# Patient Record
Sex: FEMALE | Race: WHITE | URBAN - METROPOLITAN AREA
[De-identification: names, ages, dates, MRNs, and addresses within clinical notes are randomized per-mention and may not be internally consistent; named-entity substitution may affect disease eponyms.]

---

## 2021-06-23 ENCOUNTER — OFFICE VISIT (OUTPATIENT)
Dept: ORTHOPEDIC SURGERY | Age: 36
End: 2021-06-23
Payer: OTHER GOVERNMENT

## 2021-06-23 VITALS
BODY MASS INDEX: 29.52 KG/M2 | RESPIRATION RATE: 16 BRPM | HEIGHT: 63 IN | HEART RATE: 100 BPM | WEIGHT: 166.6 LBS | OXYGEN SATURATION: 100 % | TEMPERATURE: 98.3 F

## 2021-06-23 DIAGNOSIS — M25.561 CHRONIC PAIN OF RIGHT KNEE: ICD-10-CM

## 2021-06-23 DIAGNOSIS — G89.29 CHRONIC PAIN OF RIGHT KNEE: ICD-10-CM

## 2021-06-23 DIAGNOSIS — M22.2X1 RIGHT PATELLOFEMORAL SYNDROME: Primary | ICD-10-CM

## 2021-06-23 PROCEDURE — 99203 OFFICE O/P NEW LOW 30 MIN: CPT | Performed by: ORTHOPAEDIC SURGERY

## 2021-06-23 PROCEDURE — 73562 X-RAY EXAM OF KNEE 3: CPT | Performed by: ORTHOPAEDIC SURGERY

## 2021-06-23 RX ORDER — BUPROPION HYDROCHLORIDE 150 MG/1
TABLET, EXTENDED RELEASE ORAL
COMMUNITY
Start: 2020-09-24

## 2021-06-23 RX ORDER — OXYCODONE HYDROCHLORIDE 15 MG/1
TABLET ORAL
COMMUNITY
Start: 2021-05-11

## 2021-06-23 RX ORDER — CYCLOBENZAPRINE HCL 5 MG
5 TABLET ORAL AT BEDTIME
COMMUNITY

## 2021-06-23 NOTE — PROGRESS NOTES
Patient: Sarah Live                MRN: [id number]       SSN: [id number]  YOB: 1985        AGE: 37 y.o. SEX: female  Body mass index is 29.51 kg/m². PCP: Cesar Caceres NP  21    CHIEF COMPLAINT: RIght knee pain 3/10    HPI: Sarah Live is a 37 y.o. female patient who presents to the office today with approximate 1 year of right knee pain. She describes the pain as a sharp searing pain deep in the knee. She localizes it to deep to the kneecap. She has not had any specific treatment for this. She has had an MRI. She has not had any physical therapy. She does not take anything for the pain. She does take medication for chronic back pain. She says the pain is worse when she goes up and down stairs or gets up from a seated position. Past Medical History:   Diagnosis Date    Baastrup's syndrome     Bulging lumbar disc     Kyphosis     Migraine     Scoliosis     Tachycardia     Tarlov cyst     Vertigo        History reviewed. No pertinent family history. Current Outpatient Medications   Medication Sig Dispense Refill    oxyCODONE IR (OXY-IR) 15 mg immediate release tablet 1 TABLET 4 TIMES PER DAY AS NEEDED FOR CHRONIC BACK PAIN ORALLY 30 DAYS      buPROPion SR (WELLBUTRIN SR) 150 mg SR tablet TK 1 T PO BID      cyclobenzaprine (FLEXERIL) 5 mg tablet Take 5 mg by mouth At bedtime.          Allergies   Allergen Reactions    Acetazolamide Rash    Duloxetine Nausea Only and Other (comments)       Past Surgical History:   Procedure Laterality Date    HX  SECTION      HX TUBAL LIGATION         Social History     Socioeconomic History    Marital status:      Spouse name: Not on file    Number of children: Not on file    Years of education: Not on file    Highest education level: Not on file   Occupational History    Not on file   Tobacco Use    Smoking status: Current Every Day Smoker     Types: Cigarettes    Smokeless tobacco: Never Used Vaping Use    Vaping Use: Never used   Substance and Sexual Activity    Alcohol use: Never    Drug use: Never    Sexual activity: Not on file   Other Topics Concern    Not on file   Social History Narrative    Not on file     Social Determinants of Health     Financial Resource Strain:     Difficulty of Paying Living Expenses:    Food Insecurity:     Worried About Running Out of Food in the Last Year:     [address] in the Last Year:    Transportation Needs:     Lack of Transportation (Medical):  Lack of Transportation (Non-Medical):    Physical Activity:     Days of Exercise per Week:     Minutes of Exercise per Session:    Stress:     Feeling of Stress :    Social Connections:     Frequency of Communication with Friends and Family:     Frequency of Social Gatherings with Friends and Family:     Attends [employer] Services:     Active Member of Clubs or Organizations:     Attends Club or Organization Meetings:     Marital Status:    Intimate Partner Violence:     Fear of Current or Ex-Partner:     Emotionally Abused:     Physically Abused:     Sexually Abused:        REVIEW OF SYSTEMS:      CON: negative for recent weight loss/gain, fever, or chills  EYE: negative for double or blurry vision  ENT: negative for hoarseness  RS:   negative for cough, URI, SOB  CV:  negative for chest pain, palpitations  GI:    negative for blood in stool, nausea/vomiting  :  negative for blood in urine  MS: As per HPI  Other systems reviewed and noted below. PHYSICAL EXAMINATION:  Visit Vitals  Pulse 100   Temp 98.3 °F (36.8 °C) (Temporal)   Resp 16   Ht 5' 3\" (1.6 m)   Wt 166 lb 9.6 oz (75.6 kg)   LMP 05/27/2021 (Exact Date)   SpO2 100%   BMI 29.51 kg/m²     Body mass index is 29.51 kg/m². GENERAL: Alert and oriented x3, in no acute distress, well-developed, well-nourished. HEENT: Normocephalic, atraumatic. RESP: Non labored breathing with equal chest rise on inspiration.   CV: Well perfused extremities. No cyanosis or clubbing noted. ABDOMEN: Soft, non-tender, non-distended. Knee Examination      R   L  Effusion   -   -  Warmth   -   -  Erythema   -   -  ROM   Extension  Full   Full   Flexion   Full   Full  Tenderness   Medial Joint  -   -   Lateral Joint  -   -   Posterior knee  -   -  Strength   Quad   5   5   Hamstring  5   5  Crepitus   Tibiofemoral   -   -   Patellofemoral  -   -  Instability   Anterior  -   -   Posterior  -   -   Patellofemoral  -   -  L[employer]'s   -   -  Anterior Drawer  -   -  Posterior Drawer  -   -  Penny's   Pain   -   -   Locking  -   -  Calf TTP   -   -  Straight Leg Raise  -   -      IMAGING:  X-rays of the right knee were reviewed in the office today. This is 3 views. They do not show any acute or chronic bony abnormalities. An MRI of the right knee was also reviewed in the office today. The MRI does not show any meniscal or ligamentous pathology. ASSESSMENT & PLAN  Diagnosis: Right knee patellofemoral syndrome    I recommended physical therapy and a home exercise program for Soniya today for her right knee patellofemoral pain. I discussed with her the findings of the x-rays, MRI, and physical examination today. This can be somewhat difficult to fully treat but I recommend physical therapy and no surgery at this time.       Electronically signed by: Eris Adamson MD